# Patient Record
Sex: MALE | Race: WHITE | NOT HISPANIC OR LATINO | Employment: FULL TIME | URBAN - METROPOLITAN AREA
[De-identification: names, ages, dates, MRNs, and addresses within clinical notes are randomized per-mention and may not be internally consistent; named-entity substitution may affect disease eponyms.]

---

## 2020-11-28 ENCOUNTER — CLINICAL SUPPORT (OUTPATIENT)
Dept: URGENT CARE | Facility: CLINIC | Age: 30
End: 2020-11-28
Payer: COMMERCIAL

## 2020-11-28 DIAGNOSIS — U07.1 COVID-19: Primary | ICD-10-CM

## 2020-11-28 LAB
CTP QC/QA: YES
SARS-COV-2 RDRP RESP QL NAA+PROBE: NEGATIVE

## 2020-11-28 PROCEDURE — U0002 COVID-19 LAB TEST NON-CDC: HCPCS | Mod: QW,S$GLB,, | Performed by: INTERNAL MEDICINE

## 2020-11-28 PROCEDURE — U0002: ICD-10-PCS | Mod: QW,S$GLB,, | Performed by: INTERNAL MEDICINE

## 2020-11-28 NOTE — LETTER
[address] [facility], [address] ? [place], [address] ? Phone [phone] ? Fax [phone]           Return to Work/School    Patient: Estuardo Wen  YOB: 1990   Date: 11/28/2020      To Whom It May Concern:     Estuardo Wen was in contact with/seen in my office on 11/28/2020. COVID-19 is present in our communities across the [facility]. Not all patients are eligible or appropriate to be tested. In this situation, your employee meets the following criteria:     Estuardo Wen has met the criteria for COVID-19 testing and has a NEGATIVE result. The employee can return to work once they are asymptomatic for 24 hours without the use of fever reducing medications (Tylenol, Motrin, etc).     If you have any questions or concerns, or if I can be of further assistance, please do not hesitate to contact me.     Sincerely,    NURSE URGENT CARE, [facility]

## 2020-11-30 ENCOUNTER — OFFICE VISIT (OUTPATIENT)
Dept: URGENT CARE | Facility: CLINIC | Age: 30
End: 2020-11-30
Payer: COMMERCIAL

## 2020-11-30 VITALS
DIASTOLIC BLOOD PRESSURE: 79 MMHG | RESPIRATION RATE: 18 BRPM | OXYGEN SATURATION: 98 % | SYSTOLIC BLOOD PRESSURE: 150 MMHG | HEART RATE: 75 BPM | TEMPERATURE: 98 F

## 2020-11-30 DIAGNOSIS — R50.9 FEVER, UNSPECIFIED FEVER CAUSE: ICD-10-CM

## 2020-11-30 DIAGNOSIS — Z20.822 EXPOSURE TO COVID-19 VIRUS: Primary | ICD-10-CM

## 2020-11-30 LAB
CTP QC/QA: YES
SARS-COV-2 RDRP RESP QL NAA+PROBE: NEGATIVE

## 2020-11-30 PROCEDURE — 99202 PR OFFICE/OUTPT VISIT, NEW, LEVL II, 15-29 MIN: ICD-10-PCS | Mod: S$GLB,,, | Performed by: NURSE PRACTITIONER

## 2020-11-30 PROCEDURE — 1126F AMNT PAIN NOTED NONE PRSNT: CPT | Mod: S$GLB,,, | Performed by: NURSE PRACTITIONER

## 2020-11-30 PROCEDURE — U0002 COVID-19 LAB TEST NON-CDC: HCPCS | Mod: QW,S$GLB,, | Performed by: NURSE PRACTITIONER

## 2020-11-30 PROCEDURE — 99202 OFFICE O/P NEW SF 15 MIN: CPT | Mod: S$GLB,,, | Performed by: NURSE PRACTITIONER

## 2020-11-30 PROCEDURE — U0002: ICD-10-PCS | Mod: QW,S$GLB,, | Performed by: NURSE PRACTITIONER

## 2020-11-30 PROCEDURE — 1126F PR PAIN SEVERITY QUANTIFIED, NO PAIN PRESENT: ICD-10-PCS | Mod: S$GLB,,, | Performed by: NURSE PRACTITIONER

## 2020-11-30 NOTE — PROGRESS NOTES
Subjective:       Patient ID: Estuardo Wen is a 30 y.o. male.    Vitals:  temporal temperature is 97.9 °F (36.6 °C). His blood pressure is 150/79 (abnormal) and his pulse is 75. His respiration is 18 and oxygen saturation is 98%.     Chief Complaint: Fever    Fever   This is a new problem. The current episode started today. The problem occurs constantly. The problem has been unchanged. Pertinent negatives include no congestion, coughing, ear pain, nausea, rash, sore throat, vomiting or wheezing. He has tried nothing for the symptoms.       Constitution: Positive for fever. Negative for chills, sweating and fatigue.   HENT: Negative for ear pain, congestion, sinus pain, sinus pressure, sore throat and voice change.    Neck: Negative for painful lymph nodes.   Eyes: Negative for eye redness.   Respiratory: Negative for chest tightness, cough, sputum production, bloody sputum, COPD, shortness of breath, stridor, wheezing and asthma.    Gastrointestinal: Negative for nausea and vomiting.   Musculoskeletal: Negative for muscle ache.   Skin: Negative for rash.   Allergic/Immunologic: Negative for seasonal allergies and asthma.   Hematologic/Lymphatic: Negative for swollen lymph nodes.       Objective:      Physical Exam   Audio Only Telehealth Visit     The patient location is: [facility]   The chief complaint leading to consultation is:  Exposure to COVID-19 virus  Visit type: Virtual visit with audio only (telephone)  Total time spent with patient:  15 minutes     The reason for the audio only service rather than synchronous audio and video virtual visit was related to technical difficulties or patient preference/necessity.     Each patient to whom I provide medical services by telemedicine is:  (1) informed of the relationship between the physician and patient and the respective role of any other health care provider with respect to management of the patient; and (2) notified that they may decline  to receive medical services by telemedicine and may withdraw from such care at any time. Patient verbally consented to receive this service via voice-only telephone call.       HPI: 30-year-old male reports to the clinic for exposure to COVID-19 virus.  He is accompanied by his wife who is also getting tested for COVID-19 virus on today.  Patient works at a local [employer] but has not been in to work because his employer wants him to get tested before returning to work.  Patient has also been exposed to his mother, both spouses parents and brother local all tested positive for COVID this past week.  Patient has been in close contact with all her relatives and a Friday this week and will have similar symptoms.  Patient only reports fever at this time.      Assessment and plan:  See below       This service was not originating from a related E/M service provided within the previous 7 days nor will  to an E/M service or procedure within the next 24 hours or my soonest available appointment.  Prevailing standard of care was able to be met in this audio-only visit.        Results for orders placed or performed in visit on 11/30/20   POCT COVID-19 Rapid Screening   Result Value Ref Range    POC Rapid COVID Negative Negative     Acceptable Yes       Assessment:       1. Exposure to COVID-19 virus    2. Fever, unspecified fever cause        Plan:         Exposure to COVID-19 virus    Fever, unspecified fever cause  -     POCT COVID-19 Rapid Screening      Patient Instructions   CDC Testing and Quarantine Guidelines for Exposure:  A close exposure is defined as anyone who had a masked or an unmasked exposure to a known COVID -19 positive person, at less than 6 ft for more than 15 minutes. If your exposure meets this definition, then you are required to quarantine for 14 days per the CDC. They now recommend that a test can be performed if you are asymptomatic (someone who does not have any symptoms), and  a test should be done if you develop symptoms after an exposure as described above.  If you meet the definition of a close exposure, it does not matter whether or not you are asymptomatic or symptomatic - A NEGATIVE TEST DOES NOT GET YOU OUT OF 14 DAYS OF QUARANTINE!  Please note that if you are asymptomatic and wait more than 4 days to test after an exposure, you risk lengthening your quarantine. This is because if you test positive as an asymptomatic, your isolation is 10 days from the date of the positive test, not the date of exposure. So for example, if you test positive as an asymptomatic on day 7 from exposure, you have now extended your 14 day quarantine to a 17 day isolation.  If your exposure does not meet the above definition, you may return to your normal activities including social distancing, wearing masks, and frequent handwashing.      Febrile Illness, Uncertain Cause (Adult)  You have a fever, but the cause is not certain. A fever is a natural reaction of the body to an illness such as infection due to a virus or bacteria. In most cases, the temperature itself is not harmful. It actually helps the body fight infections. A fever does not need to be treated unless you feel very uncomfortable.  Sometimes a fever can be an early sign of a more serious infection, so make sure to follow up if your condition worsens.  Home care  Unless given other instructions by your healthcare provider, follow these guidelines when caring for yourself at home.  General care  · If your symptoms are severe, rest at home for the first 2 to 3 days. When you resume activity, don't let yourself get too tired.  · Do not smoke. Also avoid being exposed to secondhand smoke.  · Your appetite may be poor, so a light diet is fine. Avoid dehydration by drinking 6 to 8 glasses of fluids per day (such as water, soft drinks, sports drinks, juices, tea, or soup). Extra fluids will help loosen secretions in the nose and  lungs.  Medicines  · You can take acetaminophen or ibuprofen for pain, unless you were given a different fever-reducing/pain medicine to use. (Note: If you have chronic liver or kidney disease or have ever had a stomach ulcer or gastrointestinal bleeding, talk with your healthcare provider before using these medicines. Also talk to your provider if you are taking medicine to prevent blood clots.) Aspirin should never be given to anyone younger than 18 years of age who is ill with a viral infection or fever. It may cause severe liver or brain damage.  · If you were given antibiotics, take them until they are used up, or your healthcare provider tells you to stop. It is important to finish the antibiotics even though you feel better. This is to make sure the infection has cleared. Be aware that antibiotics are not usually given for a fever with an unknown cause.  · Over-the-counter medicines will not shorten the duration of the illness. However, they may be helpful for the following symptoms: cough, sore throat, or nasal and sinus congestion. Ask your pharmacist for product suggestions. (Note: Do not use decongestants if you have high blood pressure.)  Follow-up care  Follow up with your healthcare provider, or as advised.  · If a culture was done, you will be notified if your treatment needs to be changed. You can call as directed for the results.  · If X-rays, a CT, or an ultrasound were done, a specialist will review them. You will be notified of any findings that may affect your care.  Call 911  Contact emergency services right away if any of these occur:  · Trouble breathing or swallowing, or wheezing  · Chest pain  · Confusion  · Extreme drowsiness or trouble awakening  · Fainting or loss of consciousness  · Rapid heart rate  · Low blood pressure  · Vomiting blood, or large amounts of blood in stool  · Seizure  When to seek medical advice  Call your healthcare provider right away if any of these occur:  · Cough  with lots of colored sputum (mucus) or blood in your sputum  · Severe headache  · Face, neck, throat, or ear pain  · Feeling drowsy  · Abdominal pain  · Repeated vomiting or diarrhea  · Joint pain or a new rash  · Burning when urinating  · Fever of 100.4°F (38°C) or higher, that does not get better after taking fever-reducing medicine  · Feeling weak or dizzy  Date Last Reviewed: 7/30/2015  © 9769-9112 [employer]. [address] [place] [address], [place] [address]. All rights reserved. This information is not intended as a substitute for professional medical care. Always follow your healthcare professional's instructions.

## 2020-11-30 NOTE — PATIENT INSTRUCTIONS
CDC Testing and Quarantine Guidelines for Exposure:  A close exposure is defined as anyone who had a masked or an unmasked exposure to a known COVID -19 positive person, at less than 6 ft for more than 15 minutes. If your exposure meets this definition, then you are required to quarantine for 14 days per the CDC. They now recommend that a test can be performed if you are asymptomatic (someone who does not have any symptoms), and a test should be done if you develop symptoms after an exposure as described above.  If you meet the definition of a close exposure, it does not matter whether or not you are asymptomatic or symptomatic - A NEGATIVE TEST DOES NOT GET YOU OUT OF 14 DAYS OF QUARANTINE!  Please note that if you are asymptomatic and wait more than 4 days to test after an exposure, you risk lengthening your quarantine. This is because if you test positive as an asymptomatic, your isolation is 10 days from the date of the positive test, not the date of exposure. So for example, if you test positive as an asymptomatic on day 7 from exposure, you have now extended your 14 day quarantine to a 17 day isolation.  If your exposure does not meet the above definition, you may return to your normal activities including social distancing, wearing masks, and frequent handwashing.      Febrile Illness, Uncertain Cause (Adult)  You have a fever, but the cause is not certain. A fever is a natural reaction of the body to an illness such as infection due to a virus or bacteria. In most cases, the temperature itself is not harmful. It actually helps the body fight infections. A fever does not need to be treated unless you feel very uncomfortable.  Sometimes a fever can be an early sign of a more serious infection, so make sure to follow up if your condition worsens.  Home care  Unless given other instructions by your healthcare provider, follow these guidelines when caring for yourself at home.  General care  · If your symptoms  are severe, rest at home for the first 2 to 3 days. When you resume activity, don't let yourself get too tired.  · Do not smoke. Also avoid being exposed to secondhand smoke.  · Your appetite may be poor, so a light diet is fine. Avoid dehydration by drinking 6 to 8 glasses of fluids per day (such as water, soft drinks, sports drinks, juices, tea, or soup). Extra fluids will help loosen secretions in the nose and lungs.  Medicines  · You can take acetaminophen or ibuprofen for pain, unless you were given a different fever-reducing/pain medicine to use. (Note: If you have chronic liver or kidney disease or have ever had a stomach ulcer or gastrointestinal bleeding, talk with your healthcare provider before using these medicines. Also talk to your provider if you are taking medicine to prevent blood clots.) Aspirin should never be given to anyone younger than 18 years of age who is ill with a viral infection or fever. It may cause severe liver or brain damage.  · If you were given antibiotics, take them until they are used up, or your healthcare provider tells you to stop. It is important to finish the antibiotics even though you feel better. This is to make sure the infection has cleared. Be aware that antibiotics are not usually given for a fever with an unknown cause.  · Over-the-counter medicines will not shorten the duration of the illness. However, they may be helpful for the following symptoms: cough, sore throat, or nasal and sinus congestion. Ask your pharmacist for product suggestions. (Note: Do not use decongestants if you have high blood pressure.)  Follow-up care  Follow up with your healthcare provider, or as advised.  · If a culture was done, you will be notified if your treatment needs to be changed. You can call as directed for the results.  · If X-rays, a CT, or an ultrasound were done, a specialist will review them. You will be notified of any findings that may affect your care.  Call 911  Contact  emergency services right away if any of these occur:  · Trouble breathing or swallowing, or wheezing  · Chest pain  · Confusion  · Extreme drowsiness or trouble awakening  · Fainting or loss of consciousness  · Rapid heart rate  · Low blood pressure  · Vomiting blood, or large amounts of blood in stool  · Seizure  When to seek medical advice  Call your healthcare provider right away if any of these occur:  · Cough with lots of colored sputum (mucus) or blood in your sputum  · Severe headache  · Face, neck, throat, or ear pain  · Feeling drowsy  · Abdominal pain  · Repeated vomiting or diarrhea  · Joint pain or a new rash  · Burning when urinating  · Fever of 100.4°F (38°C) or higher, that does not get better after taking fever-reducing medicine  · Feeling weak or dizzy  Date Last Reviewed: 7/30/2015  © 9467-4567 [employer]. [address] [place] [address], [place] [address]. All rights reserved. This information is not intended as a substitute for professional medical care. Always follow your healthcare professional's instructions.

## 2020-11-30 NOTE — LETTER
[address], [address] ? [place] [address] ? Phone [phone] ? Fax [phone]           Return to Work/School    Patient: Estuardo Wen  YOB: 1990   Date: 11/30/2020      To Whom It May Concern:     Estuardo Wen was in contact with/seen in my office on 11/30/2020. COVID-19 is present in our communities across the state. Not all patients are eligible or appropriate to be tested. In this situation, your employee meets the following criteria:     Estuardo Wen has met the criteria for COVID-19 testing and has a NEGATIVE result.     CDC Testing and Quarantine Guidelines for Exposure:  A close exposure is defined as anyone who had a masked or an unmasked exposure to a known COVID -19 positive person, at less than 6 ft for more than 15 minutes. If your exposure meets this definition, then you are required to quarantine for 14 days per the CDC. Once you complete your quarantine, the employee can return to work once they are asymptomatic for 24 hours without the use of fever reducing medications (Tylenol, Motrin, etc). They can then return to normal activities including social distancing, wearing masks, and frequent handwashing.    Sincerely,    Tammy Small NP

## 2020-12-06 ENCOUNTER — CLINICAL SUPPORT (OUTPATIENT)
Dept: URGENT CARE | Facility: CLINIC | Age: 30
End: 2020-12-06
Payer: COMMERCIAL

## 2020-12-06 VITALS — TEMPERATURE: 99 F

## 2020-12-06 DIAGNOSIS — U07.1 COVID-19 VIRUS DETECTED: ICD-10-CM

## 2020-12-06 DIAGNOSIS — U07.1 COVID-19: Primary | ICD-10-CM

## 2020-12-06 LAB
CTP QC/QA: YES
SARS-COV-2 RDRP RESP QL NAA+PROBE: POSITIVE

## 2020-12-06 PROCEDURE — U0002 COVID-19 LAB TEST NON-CDC: HCPCS | Mod: QW,S$GLB,, | Performed by: NURSE PRACTITIONER

## 2020-12-06 PROCEDURE — U0002: ICD-10-PCS | Mod: QW,S$GLB,, | Performed by: NURSE PRACTITIONER

## 2020-12-06 NOTE — PATIENT INSTRUCTIONS
You have tested positive for COVID-19 today.  Please note that patients who test positive for COVID-19 are required by the CDC to undergo isolation for 10 days after their symptoms first began.  This isolation starts from the day you first developed symptoms, not the day of your positive test. For example, if your symptoms began on a Monday but tested positive on the following Wednesday, your 10-day isolation begins from that Monday, not the Wednesday you tested positive.  However, if you are asymptomatic (a person who does not have any symptoms) and COVID-19 positive, your 10-day isolation begins on the day you tested positive, regardless of exposure date.  Also, per the CDC guidelines, once your 10 days have passed, and you have not had fever greater than 100.4F in the last 24 hours without taking any fever reducers such as Tylenol (Acetaminophen) or Motrin (Ibuprofen), you may return to your normal activities including social distancing, wearing masks, and frequent handwashing - YOU DO NOT NEED ANOTHER TEST IN ORDER TO END YOUR QUARANTINE.

## 2022-02-12 ENCOUNTER — HOSPITAL ENCOUNTER (EMERGENCY)
Facility: HOSPITAL | Age: 32
Discharge: HOME | End: 2022-02-12
Attending: EMERGENCY MEDICINE
Payer: COMMERCIAL

## 2022-02-12 VITALS
DIASTOLIC BLOOD PRESSURE: 94 MMHG | WEIGHT: 240 LBS | OXYGEN SATURATION: 97 % | RESPIRATION RATE: 17 BRPM | HEART RATE: 91 BPM | BODY MASS INDEX: 34.36 KG/M2 | SYSTOLIC BLOOD PRESSURE: 144 MMHG | TEMPERATURE: 98 F | HEIGHT: 70 IN

## 2022-02-12 DIAGNOSIS — M54.50 ACUTE LEFT-SIDED LOW BACK PAIN WITHOUT SCIATICA: Primary | ICD-10-CM

## 2022-02-12 PROCEDURE — 63600175 PHARM REV CODE 636 W HCPCS: Mod: ER | Performed by: EMERGENCY MEDICINE

## 2022-02-12 PROCEDURE — 96372 THER/PROPH/DIAG INJ SC/IM: CPT | Mod: ER

## 2022-02-12 PROCEDURE — 99284 EMERGENCY DEPT VISIT MOD MDM: CPT | Mod: 25,ER

## 2022-02-12 RX ORDER — IBUPROFEN 600 MG/1
600 TABLET ORAL EVERY 6 HOURS PRN
Qty: 20 TABLET | Refills: 0 | Status: SHIPPED | OUTPATIENT
Start: 2022-02-12

## 2022-02-12 RX ORDER — ORPHENADRINE CITRATE 30 MG/ML
60 INJECTION INTRAMUSCULAR; INTRAVENOUS
Status: COMPLETED | OUTPATIENT
Start: 2022-02-12 | End: 2022-02-12

## 2022-02-12 RX ORDER — KETOROLAC TROMETHAMINE 30 MG/ML
30 INJECTION, SOLUTION INTRAMUSCULAR; INTRAVENOUS
Status: COMPLETED | OUTPATIENT
Start: 2022-02-12 | End: 2022-02-12

## 2022-02-12 RX ORDER — METHOCARBAMOL 750 MG/1
1500 TABLET, FILM COATED ORAL 3 TIMES DAILY
Qty: 30 TABLET | Refills: 0 | Status: SHIPPED | OUTPATIENT
Start: 2022-02-12 | End: 2022-02-17

## 2022-02-12 RX ADMIN — ORPHENADRINE CITRATE 60 MG: 30 INJECTION INTRAMUSCULAR; INTRAVENOUS at 08:02

## 2022-02-12 RX ADMIN — KETOROLAC TROMETHAMINE 30 MG: 30 INJECTION, SOLUTION INTRAMUSCULAR at 08:02

## 2022-02-12 NOTE — DISCHARGE INSTRUCTIONS
Take medications as directed.  Apply heat for 20 minutes, then do gentle stretches or massage, then apply ice for 20 minutes.  Do this 2-3 times a day.  See your doctor if you are not better with this treatment.

## 2022-02-12 NOTE — ED PROVIDER NOTES
"Encounter Date: 2/12/2022    SCRIBE #1 NOTE: I, Citlalli Toro, am scribing for, and in the presence of,  Elsa Kerns MD. I have scribed the following portions of the note - Other sections scribed: HPI; ROS; PE.       History     Chief Complaint   Patient presents with    Back Pain     Pt reports left sided lower back pain that starts at the hip and radiates to middle of back with twisting motions that was made worse after driving a long distance.     Estuardo Wen is a 31 y.o. male with Hx of epilepsy who presents to the ED for chief complaint of back pain onset 6 days ago. No known trauma.  Pain has been intermittent, worse with certain movements.  Pain go acutely worse after driving to and from [place] yesterday.  He states that he has a shooting pain from the "left tailbone" up the mid-back. Patient reports that the pain is intermittent and is triggered by moving "a certain way or twisting" his back. He has attempted treatment with Blue Emu cream, Goodies powder, and wearing a back brace with no relief. Patient denies having similar symptoms in the past. He denies numbness, weakness, or loss of bowel/bladder control. Patient is allergic to aspirin. He endorses tobacco use.  No further complaints at this present time.     The history is provided by the patient. No  was used.     Review of patient's allergies indicates:   Allergen Reactions    Aspirin      Unknown     Past Medical History:   Diagnosis Date    Seizures      Past Surgical History:   Procedure Laterality Date    ADENOIDECTOMY      Orthopedic      Broken right ankle      Family History   Problem Relation Age of Onset    Heart disease Maternal Grandfather      Social History     Tobacco Use    Smoking status: Former Smoker     Packs/day: 0.50     Years: 4.00     Pack years: 2.00     Types: Cigarettes    Smokeless tobacco: Never Used   Substance Use Topics    Alcohol use: Yes     Alcohol/week: 0.8 - 2.5 standard drinks     " Types: 1 - 3 drink(s) per week     Comment: Socially     Drug use: No     Review of Systems   Constitutional: Negative for activity change, appetite change, chills and fever.   HENT: Negative for congestion, rhinorrhea, sneezing and sore throat.    Respiratory: Negative for cough, chest tightness, shortness of breath and wheezing.    Cardiovascular: Negative for chest pain and palpitations.   Gastrointestinal: Negative for abdominal pain, diarrhea, nausea and vomiting.   Musculoskeletal: Positive for back pain and myalgias. Negative for gait problem and joint swelling.   Skin: Negative for color change, pallor, rash and wound.   Neurological: Negative for dizziness, syncope, weakness, light-headedness, numbness and headaches.        Negative for loss of bowel/bladder control.   All other systems reviewed and are negative.      Physical Exam     Initial Vitals [02/12/22 0832]   BP Pulse Resp Temp SpO2   (!) 144/94 91 17 98.2 °F (36.8 °C) 97 %      MAP       --         Physical Exam    Nursing note and vitals reviewed.  Constitutional: He appears well-developed and well-nourished. No distress.   HENT:   Head: Normocephalic and atraumatic.   Eyes: Conjunctivae are normal.   Neck:   Normal range of motion.  Cardiovascular: Normal rate and regular rhythm.   No murmur heard.  Pulmonary/Chest: Breath sounds normal. No respiratory distress.   Abdominal: Bowel sounds are normal. He exhibits no distension.   Musculoskeletal:      Cervical back: Normal range of motion.      Lumbar back: Decreased range of motion.      Comments: No bony or muscle ttp, no overlying skin changes     Neurological: He is alert and oriented to person, place, and time.   Skin: Skin is warm and dry.   Psychiatric: He has a normal mood and affect. His behavior is normal.         ED Course   Procedures  Labs Reviewed - No data to display       Imaging Results    None          Medications   ketorolac injection 30 mg (30 mg Intramuscular Given 2/12/22  0871)   orphenadrine injection 60 mg (60 mg Intramuscular Given 2/12/22 0856)     Medical Decision Making:   History:   Old Medical Records: I decided to obtain old medical records.  ED Management:  Acute lumbar strain - neuro intact.  No trauma.  Treat with NSAIDs and muscle relaxers.            Scribe Attestation:   Scribe #1: I performed the above scribed service and the documentation accurately describes the services I performed. I attest to the accuracy of the note.               I, Dr. Elsa Kerns, personally performed the services described in this documentation.   All medical record entries made by the scribe were at my direction and in my presence.   I have reviewed the chart and agree that the record is accurate and complete.   Elsa Kerns MD.  8:52 AM 02/12/2022   Clinical Impression:   Final diagnoses:  [M54.50] Acute left-sided low back pain without sciatica (Primary)          ED Disposition Condition    Discharge Stable        ED Prescriptions     Medication Sig Dispense Start Date End Date Auth. Provider    methocarbamoL (ROBAXIN) 750 MG Tab Take 2 tablets (1,500 mg total) by mouth 3 (three) times daily. for 5 days 30 tablet 2/12/2022 2/17/2022 Elsa Kerns MD    ibuprofen (ADVIL,MOTRIN) 600 MG tablet Take 1 tablet (600 mg total) by mouth every 6 (six) hours as needed for Pain. 20 tablet 2/12/2022  Elsa Kerns MD        Follow-up Information    None          Elsa Kerns MD  02/12/22 4725